# Patient Record
Sex: MALE | Race: WHITE | NOT HISPANIC OR LATINO | Employment: STUDENT | URBAN - METROPOLITAN AREA
[De-identification: names, ages, dates, MRNs, and addresses within clinical notes are randomized per-mention and may not be internally consistent; named-entity substitution may affect disease eponyms.]

---

## 2021-02-05 ENCOUNTER — MEDICAL CORRESPONDENCE (OUTPATIENT)
Dept: HEALTH INFORMATION MANAGEMENT | Facility: CLINIC | Age: 21
End: 2021-02-05

## 2021-02-05 ENCOUNTER — TRANSFERRED RECORDS (OUTPATIENT)
Dept: HEALTH INFORMATION MANAGEMENT | Facility: CLINIC | Age: 21
End: 2021-02-05

## 2021-02-16 NOTE — TELEPHONE ENCOUNTER
REFERRAL INFORMATION:    Referring Provider:  Dr. Baltazar Blevins    Referring Clinic:  [facility]     Reason for Visit/Diagnosis: Inguinal Hernia        FUTURE VISIT INFORMATION:    Appointment Date: 2/18/2021    Appointment Time: 7 AM      NOTES RECORD STATUS  DETAILS   OFFICE NOTE from Referring Provider Received 1/11/2021 Office visit with Dr. Blevins    OFFICE NOTE from Other Specialists N/A    HOSPITAL DISCHARGE SUMMARY/ ED VISITS  N/A    OPERATIVE REPORT N/A    ENDOSCOPY (EGD)  N/A    PERTINENT LABS N/A    PATHOLOGY REPORTS (RELATED) N/A    IMAGING (CT, MRI, US, XR)  N/A

## 2021-02-18 ENCOUNTER — OFFICE VISIT (OUTPATIENT)
Dept: SURGERY | Facility: CLINIC | Age: 21
End: 2021-02-18
Payer: COMMERCIAL

## 2021-02-18 ENCOUNTER — PRE VISIT (OUTPATIENT)
Dept: SURGERY | Facility: CLINIC | Age: 21
End: 2021-02-18

## 2021-02-18 VITALS
HEART RATE: 66 BPM | SYSTOLIC BLOOD PRESSURE: 119 MMHG | WEIGHT: 167.1 LBS | OXYGEN SATURATION: 97 % | BODY MASS INDEX: 25.33 KG/M2 | DIASTOLIC BLOOD PRESSURE: 77 MMHG | HEIGHT: 68 IN

## 2021-02-18 DIAGNOSIS — K40.90 NON-RECURRENT UNILATERAL INGUINAL HERNIA WITHOUT OBSTRUCTION OR GANGRENE: Primary | ICD-10-CM

## 2021-02-18 PROCEDURE — 99202 OFFICE O/P NEW SF 15 MIN: CPT | Performed by: SURGERY

## 2021-02-18 RX ORDER — CLINDAMYCIN PHOSPHATE 900 MG/50ML
900 INJECTION, SOLUTION INTRAVENOUS
Status: CANCELLED | OUTPATIENT
Start: 2021-02-18

## 2021-02-18 RX ORDER — CETIRIZINE HYDROCHLORIDE 10 MG/1
10 TABLET ORAL DAILY
COMMUNITY

## 2021-02-18 RX ORDER — CLINDAMYCIN PHOSPHATE 900 MG/50ML
900 INJECTION, SOLUTION INTRAVENOUS SEE ADMIN INSTRUCTIONS
Status: CANCELLED | OUTPATIENT
Start: 2021-02-18

## 2021-02-18 ASSESSMENT — PAIN SCALES - GENERAL: PAINLEVEL: NO PAIN (0)

## 2021-02-18 ASSESSMENT — MIFFLIN-ST. JEOR: SCORE: 1737.46

## 2021-02-18 NOTE — LETTER
2/18/2021       RE: Herminio Persaud  [address]  [facility] [address]     Dear Colleague,    Thank you for referring your patient, Herminio Persaud, to the [facility] GENERAL SURGERY CLINIC [place] at [facility]. Please see a copy of my visit note below.    New Hernia Consultation Note      Herminio Persaud  [id number]  2000    Requesting Provider: Baltazar Blevins    I was asked by Baltazar Blevins to see this patient for the following problem: Herminio Persaud is a 20 year old male who presents to clinic today for the following health issues:      CHIEF COMPLAINT:  Left groin bulge of 8 years' duration.      ASSESSMENT/PLAN:  There is a small, but visible left inguinal hernia; doesn't involve scrotum; reducible.    Hernia size is less than 5cm in size.    Assessment & Plan   Problem List Items Addressed This Visit     None      Left inguinal hernia.     Will plan laparoscopic left inguinal hernia repair with mesh.  Same day surgery; [address] floor, [address] [place] [address] surgery location.    Pre-anesthesia Clinic for preop history and physical.    Will need covid test within 4 days of surgery.    25 minutes spent on the date of the encounter doing chart review, patient visit and documentation     HISTORY OF PRESENT ILLNESS:  Location: left inguinal  Severity: Mild     No flowsheet data found.    No flowsheet data found.    No flowsheet data found.              Patient Supplied Answers To HerQLes Assessment Questionnaire  No flowsheet data found.  _______________________________________________________________________            NUTRITIONAL STATUS:  No results found for: ALBUMIN    Body mass index is 25.41 kg/m .    Patient is not immunosuppressed.    Patient is not a current smoker.    No past medical history on file.    There is no problem list on file for this patient.      No past surgical history on file.    MEDICATIONS:  Current Outpatient Medications  "  Medication     cetirizine (ZYRTEC) 10 MG tablet     No current facility-administered medications for this visit.        ALLERGIES:  Allergies   Allergen Reactions     Amoxicillin Rash       Social History     Socioeconomic History     Marital status: Single     Spouse name: Not on file     Number of children: Not on file     Years of education: Not on file     Highest education level: Not on file   Occupational History     Not on file   Social Needs     Financial resource strain: Not on file     Food insecurity     Worry: Not on file     Inability: Not on file     Transportation needs     Medical: Not on file     Non-medical: Not on file   Tobacco Use     Smoking status: Not on file   Substance and Sexual Activity     Alcohol use: Not on file     Drug use: Not on file     Sexual activity: Not on file   Lifestyle     Physical activity     Days per week: Not on file     Minutes per session: Not on file     Stress: Not on file   Relationships     Social connections     Talks on phone: Not on file     Gets together: Not on file     Attends Orthodox service: Not on file     Active member of club or organization: Not on file     Attends meetings of clubs or organizations: Not on file     Relationship status: Not on file     Intimate partner violence     Fear of current or ex partner: Not on file     Emotionally abused: Not on file     Physically abused: Not on file     Forced sexual activity: Not on file   Other Topics Concern     Not on file   Social History Narrative     Not on file       No family history on file.    ROS        PHYSICAL EXAM:  Objective    /77 (BP Location: Left arm, Patient Position: Chair, Cuff Size: Adult Regular)   Pulse 66   Ht 1.727 m (5' 8\")   Wt 75.8 kg (167 lb 1.6 oz)   SpO2 97%   BMI 25.41 kg/m      Physical Exam   NAD breathing unlabored.  WDWN    There is a small left inguinal hernia; bulge is visible; confined to above scrotum.    Left/right testicles are normal.    There is " no hernia on the right side.    No skin color changes.      IMAGING:  CT scan reviewed: not needed.      DISCUSSION OF RISKS:  I discussed the alternatives, benefits, risks and possible complications of hernia repair with the patient. The risks of hernia surgery with and without mesh are described below.    Based on FDA s analysis of medical device adverse event reports and of peer-reviewed, scientific literature, the most common adverse events for all surgical repair of hernias--with or without mesh--are pain, infection, hernia recurrence, scar-like tissue that sticks tissues together (adhesion), blockage of the large or small intestine (obstruction), bleeding, abnormal connection between organs, vessels, or intestines (fistula), fluid build-up at the surgical site (seroma), and a hole in neighboring tissues or organs (perforation).  Some other potential adverse events that can occur following hernia repair with mesh are mesh migration and mesh shrinkage (contraction).    http://www.fda.gov/MedicalDevices/ProductsandMedicalProcedures/ImplantsandProsthetics/HerniaSurgicalMesh/default.htm    PLAN:  Hernia surgery is indicated, Same day surgery, Length of procedure is estimated to be 90 minutes and Refer to PAC    I spent a total of 25 minutes face-to-face with Herminio Persaud during today's office visit.  Over 50% of this time was spent counseling the patient and/or coordinating care regarding left inguinal hernia.  See note for details.        Sincerely,    Nikolas Davis MD

## 2021-02-18 NOTE — PATIENT INSTRUCTIONS
Plan laparoscopic left inguinal hernia repair.    Same day surgery.    Call Bharath at [phone] for scheduling.    Preop with PAC team (Bharath will schedule) or with Renetta.

## 2021-02-18 NOTE — NURSING NOTE
"Chief Complaint   Patient presents with     Consult     Consultation Hernia Repair Surgery       Vitals:    02/18/21 0701   BP: 119/77   BP Location: Left arm   Patient Position: Chair   Cuff Size: Adult Regular   Pulse: 66   SpO2: 97%   Weight: 75.8 kg (167 lb 1.6 oz)   Height: 1.727 m (5' 8\")       Body mass index is 25.41 kg/m .                            "

## 2021-02-18 NOTE — PROGRESS NOTES
New Hernia Consultation Note      Herminio Persaud  [id number]  2000    Requesting Provider: Baltazar Blevins    I was asked by Baltazar Blevins to see this patient for the following problem: Herminio Persaud is a 20 year old male who presents to clinic today for the following health issues:      CHIEF COMPLAINT:  Left groin bulge of 8 years' duration.      ASSESSMENT/PLAN:  There is a small, but visible left inguinal hernia; doesn't involve scrotum; reducible.    Hernia size is less than 5cm in size.    Assessment & Plan   Problem List Items Addressed This Visit     None      Left inguinal hernia.     Will plan laparoscopic left inguinal hernia repair with mesh.  Same day surgery; [facility] 5th floor, [address] [place] [address] surgery location.    Pre-anesthesia Clinic for preop history and physical.    Will need covid test within 4 days of surgery.    25 minutes spent on the date of the encounter doing chart review, patient visit and documentation     HISTORY OF PRESENT ILLNESS:  Location: left inguinal  Severity: Mild     No flowsheet data found.    No flowsheet data found.    No flowsheet data found.              Patient Supplied Answers To HerQLes Assessment Questionnaire  No flowsheet data found.  _______________________________________________________________________            NUTRITIONAL STATUS:  No results found for: ALBUMIN    Body mass index is 25.41 kg/m .    Patient is not immunosuppressed.    Patient is not a current smoker.    No past medical history on file.    There is no problem list on file for this patient.      No past surgical history on file.    MEDICATIONS:  Current Outpatient Medications   Medication     cetirizine (ZYRTEC) 10 MG tablet     No current facility-administered medications for this visit.        ALLERGIES:  Allergies   Allergen Reactions     Amoxicillin Rash       Social History     Socioeconomic History     Marital status: Single     Spouse name: Not on file     Number of  "children: Not on file     Years of education: Not on file     Highest education level: Not on file   Occupational History     Not on file   Social Needs     Financial resource strain: Not on file     Food insecurity     Worry: Not on file     Inability: Not on file     Transportation needs     Medical: Not on file     Non-medical: Not on file   Tobacco Use     Smoking status: Not on file   Substance and Sexual Activity     Alcohol use: Not on file     Drug use: Not on file     Sexual activity: Not on file   Lifestyle     Physical activity     Days per week: Not on file     Minutes per session: Not on file     Stress: Not on file   Relationships     Social connections     Talks on phone: Not on file     Gets together: Not on file     Attends Sabianism service: Not on file     Active member of club or organization: Not on file     Attends meetings of clubs or organizations: Not on file     Relationship status: Not on file     Intimate partner violence     Fear of current or ex partner: Not on file     Emotionally abused: Not on file     Physically abused: Not on file     Forced sexual activity: Not on file   Other Topics Concern     Not on file   Social History Narrative     Not on file       No family history on file.    ROS        PHYSICAL EXAM:  Objective    /77 (BP Location: Left arm, Patient Position: Chair, Cuff Size: Adult Regular)   Pulse 66   Ht 1.727 m (5' 8\")   Wt 75.8 kg (167 lb 1.6 oz)   SpO2 97%   BMI 25.41 kg/m      Physical Exam   NAD breathing unlabored.  WDWN    There is a small left inguinal hernia; bulge is visible; confined to above scrotum.    Left/right testicles are normal.    There is no hernia on the right side.    No skin color changes.      IMAGING:  CT scan reviewed: not needed.      DISCUSSION OF RISKS:  I discussed the alternatives, benefits, risks and possible complications of hernia repair with the patient. The risks of hernia surgery with and without mesh are described " below.    Based on FDA s analysis of medical device adverse event reports and of peer-reviewed, scientific literature, the most common adverse events for all surgical repair of hernias--with or without mesh--are pain, infection, hernia recurrence, scar-like tissue that sticks tissues together (adhesion), blockage of the large or small intestine (obstruction), bleeding, abnormal connection between organs, vessels, or intestines (fistula), fluid build-up at the surgical site (seroma), and a hole in neighboring tissues or organs (perforation).  Some other potential adverse events that can occur following hernia repair with mesh are mesh migration and mesh shrinkage (contraction).    http://www.fda.gov/MedicalDevices/ProductsandMedicalProcedures/ImplantsandProsthetics/HerniaSurgicalMesh/default.htm    PLAN:  Hernia surgery is indicated, Same day surgery, Length of procedure is estimated to be 90 minutes and Refer to PAC    I spent a total of 25 minutes face-to-face with Herminio Persaud during today's office visit.  Over 50% of this time was spent counseling the patient and/or coordinating care regarding left inguinal hernia.  See note for details.        Sincerely,    Nikolas Davis MD

## 2021-06-15 ENCOUNTER — OFFICE VISIT (OUTPATIENT)
Dept: FAMILY MEDICINE | Facility: CLINIC | Age: 21
End: 2021-06-15
Payer: COMMERCIAL

## 2021-06-15 VITALS
WEIGHT: 168 LBS | OXYGEN SATURATION: 97 % | TEMPERATURE: 97.9 F | BODY MASS INDEX: 25.46 KG/M2 | DIASTOLIC BLOOD PRESSURE: 62 MMHG | RESPIRATION RATE: 20 BRPM | HEART RATE: 57 BPM | HEIGHT: 68 IN | SYSTOLIC BLOOD PRESSURE: 104 MMHG

## 2021-06-15 DIAGNOSIS — K40.90 UNILATERAL INGUINAL HERNIA WITHOUT OBSTRUCTION OR GANGRENE, RECURRENCE NOT SPECIFIED: Primary | ICD-10-CM

## 2021-06-15 PROCEDURE — 99203 OFFICE O/P NEW LOW 30 MIN: CPT | Performed by: NURSE PRACTITIONER

## 2021-06-15 SDOH — HEALTH STABILITY: MENTAL HEALTH: HOW OFTEN DO YOU HAVE 6 OR MORE DRINKS ON ONE OCCASION?: MONTHLY

## 2021-06-15 SDOH — HEALTH STABILITY: MENTAL HEALTH: HOW OFTEN DO YOU HAVE A DRINK CONTAINING ALCOHOL?: 2-3 TIMES A WEEK

## 2021-06-15 SDOH — HEALTH STABILITY: MENTAL HEALTH
STRESS IS WHEN SOMEONE FEELS TENSE, NERVOUS, ANXIOUS, OR CAN'T SLEEP AT NIGHT BECAUSE THEIR MIND IS TROUBLED. HOW STRESSED ARE YOU?: NOT ASKED

## 2021-06-15 SDOH — HEALTH STABILITY: MENTAL HEALTH: HOW MANY STANDARD DRINKS CONTAINING ALCOHOL DO YOU HAVE ON A TYPICAL DAY?: 5 OR 6

## 2021-06-15 SDOH — HEALTH STABILITY: PHYSICAL HEALTH: ON AVERAGE, HOW MANY DAYS PER WEEK DO YOU ENGAGE IN MODERATE TO STRENUOUS EXERCISE (LIKE A BRISK WALK)?: 5 DAYS

## 2021-06-15 SDOH — HEALTH STABILITY: PHYSICAL HEALTH: ON AVERAGE, HOW MANY MINUTES DO YOU ENGAGE IN EXERCISE AT THIS LEVEL?: NOT ASKED

## 2021-06-15 ASSESSMENT — MIFFLIN-ST. JEOR: SCORE: 1736.54

## 2021-06-15 NOTE — PROGRESS NOTES
Assessment & Plan     Unilateral inguinal hernia without obstruction or gangrene, recurrence not specified  Patient was seen in February 2018 by Dr. Nikolas Davis and noted to have a small left inguinal hernia noted involving the scrotum.  Patient reports hernia remains and has not changed, although he feels he may be developing a right-sided hernia as well.  A laparoscopic left inguinal hernia repair with mesh was advised.  He needs a referral to see Dr. Davis again given his insurance policy.  That has been done.  May need a new consultation prior to recommended surgical procedure.  Patient was advised of this.  - GENERAL SURG ADULT REFERRAL; Future    25 minutes spent on the date of the encounter doing chart review, history and exam, documentation and further activities per the note        Patient Instructions   New referral for General Surgery.   Continue your healthy habits.   Return as needed or in one year for a physical.       Return in about 1 year (around 6/15/2022) for Routine Visit.    MARQUES [facility] JESSICA Durham is a 21 year old who presents for the following health issues     HPI     Patient presents today to establish care at the [facility].  He was previously seen in the [facility] clinic at the [facility].  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  He was seen by Dr. Nikolas Davis on February 18 for a left inguinal hernia.  He was advised to undergo a laparoscopic left inguinal hernia repair with mesh.  Patient states his insurance would not cover further care from Dr. Davis until the patient had a referral from his primary care provider.  Patient reports hernia is still present but unchanged.  It has been present for about 8 years time.  He notes he may be developing a left-sided hernia as well.    Otherwise, patient denies any chronic or acute  "health conditions.  He was smoking electronic cigarettes but recently quit that.  Drinks alcohol socially, but denies drug use.  Does this at the gym about 5 days/week to work out.  Denies any concerns with his mood other than normal life stressors.             Review of Systems   CONSTITUTIONAL: NEGATIVE for fever, chills, change in weight  INTEGUMENTARY/SKIN: NEGATIVE for worrisome rashes, moles or lesions  EYES: NEGATIVE for vision changes or irritation  ENT/MOUTH: NEGATIVE for ear, mouth and throat problems  RESP: NEGATIVE for significant cough or SOB  CV: NEGATIVE for chest pain, palpitations or peripheral edema  GI: NEGATIVE for nausea, abdominal pain, heartburn, or change in bowel habits  : NEGATIVE for frequency, dysuria, or hematuria  MUSCULOSKELETAL: NEGATIVE for significant arthralgias or myalgia  NEURO: NEGATIVE for weakness, dizziness or paresthesias  ENDOCRINE: NEGATIVE for temperature intolerance, skin/hair changes  HEME: NEGATIVE for bleeding problems  PSYCHIATRIC: NEGATIVE for changes in mood or affect      Objective    /62   Pulse 57   Temp 97.9  F (36.6  C) (Temporal)   Resp 20   Ht 1.727 m (5' 8\")   Wt 76.2 kg (168 lb)   SpO2 97%   BMI 25.54 kg/m    Body mass index is 25.54 kg/m .  Physical Exam   GENERAL: healthy, alert and no distress  EYES: Eyes grossly normal to inspection, PERRL and conjunctivae and sclerae normal  HENT: ear canals and TM's normal, nose and mouth without ulcers or lesions  NECK: no adenopathy, no asymmetry, masses, or scars and thyroid normal to palpation  RESP: lungs clear to auscultation - no rales, rhonchi or wheezes  CV: regular rate and rhythm, normal S1 S2, no S3 or S4, no murmur, click or rub, no peripheral edema and peripheral pulses strong  ABDOMEN: soft, nontender, no hepatosplenomegaly, no masses and bowel sounds normal  MS: no gross musculoskeletal defects noted, no edema  SKIN: no suspicious lesions or rashes  NEURO: Normal strength and tone, " mentation intact and speech normal  PSYCH: mentation appears normal, affect normal/bright    No results found for any previous visit.

## 2021-06-15 NOTE — PATIENT INSTRUCTIONS
New referral for General Surgery.   Continue your healthy habits.   Return as needed or in one year for a physical.

## 2022-01-13 ENCOUNTER — OFFICE VISIT (OUTPATIENT)
Dept: SURGERY | Facility: CLINIC | Age: 22
End: 2022-01-13
Payer: COMMERCIAL

## 2022-01-13 VITALS
WEIGHT: 168 LBS | BODY MASS INDEX: 25.54 KG/M2 | DIASTOLIC BLOOD PRESSURE: 74 MMHG | SYSTOLIC BLOOD PRESSURE: 118 MMHG | HEART RATE: 57 BPM

## 2022-01-13 DIAGNOSIS — K40.90 NON-RECURRENT UNILATERAL INGUINAL HERNIA WITHOUT OBSTRUCTION OR GANGRENE: Primary | ICD-10-CM

## 2022-01-13 PROCEDURE — 99203 OFFICE O/P NEW LOW 30 MIN: CPT | Performed by: SURGERY

## 2022-01-13 NOTE — LETTER
1/13/2022         RE: Herminio Persaud  [address]  [facility] [address]        Dear Colleague,    Thank you for referring your patient, Herminio Persaud, to the [facility]. Please see a copy of my visit note below.    Assessment: Primary, reducible left inguinal hernia.    Plan:    We have discussed observation, reduction techniques and importance, incarceration and strangulation signs, symptoms and importance as well as need to seek emergency treatment.      We have had a detailed discussion regarding the nature of hernias, surgery, indications, alternatives, risks, benefits, incisions, scarring, anesthesia, recovery, mesh, infection, bleeding, numbness, nerve damage and chronic pain, testicular loss, hernia recurrence, lifting and activity limitations after surgery.  All questions have been answered to the best of my ability.    We discussed open verses robotic, will do robotic and assess for a hernia on the right and if present fix it at the same time.     We will schedule surgery at the patient's convenience.      HPI:  Herminio is a 21 year old male who I was asked to see regarding his   inguinal hernia by Estefany Dugan. He presents for evaluation of left groin lump.  He first noticed it about 10 years ago. He was seen to have it fixed almost 4 years ago. He denies pain. He feels there may be a hernia on the right as well. Negative for associated symptoms of vomiting.  He has not had a previous herniorrhaphy in this location.     Past Medical History:   has a past medical history of Non-recurrent unilateral inguinal hernia.    Past Surgical History:  Past Surgical History:   Procedure Laterality Date     XR ELBOW ARTHROGRAM RIGHT        Social History:  Social History     Socioeconomic History     Marital status: Single     Spouse name: Not on file     Number of children: Not on file     Years of education: Not on file     Highest education level: Not on file   Occupational History      Not on file   Tobacco Use     Smoking status: Never Smoker     Smokeless tobacco: Former User   Substance and Sexual Activity     Alcohol use: Yes     Alcohol/week: 6.0 standard drinks     Types: 2 Glasses of wine, 2 Cans of beer, 2 Shots of liquor per week     Drug use: Never     Sexual activity: Yes     Birth control/protection: Condom     Comment: most times   Other Topics Concern     Not on file   Social History Narrative     Not on file     Social Determinants of Health     Financial Resource Strain: Not on file   Food Insecurity: Not on file   Transportation Needs: Not on file   Physical Activity: Unknown     Days of Exercise per Week: 5 days     Minutes of Exercise per Session: Not asked   Stress: Not on file   Social Connections: Not on file   Intimate Partner Violence: Not on file   Housing Stability: Not on file        Family History:  Family History   Problem Relation Age of Onset     Kidney Disease Maternal Grandmother      Asthma Brother      ROS:  10 point ROS neg other than the symptoms noted above in the HPI.      PE:   Vitals: /74   Pulse 57   Wt 76.2 kg (168 lb)   BMI 25.54 kg/m    BMI= Body mass index is 25.54 kg/m .  Constitutional: healthy, alert and no distress  Eyes: Pupils round and equal, no icterus   ENT: Mucous membranes moist  Respiratory:  Non-labored respiration  Gastrointestinal: Abdomen soft, non-tender. No masses, organomegaly  Musculoskeletal: No gross deformity  Neurologic: No gross focal deficits  Psychiatric: mentation appears normal and affect normal/bright  Hematologic/Lymphatic/Immunologic: No bruising noted  Skin: No lesions, rashes, erythema or jaundice noted    Hernia- Left inguinal hernia is present spontaneously              Right inguinal hernia is not present with valsalva              The hernia is manually reducible              Testes are descended bilaterally and normal    Arron Barahona, DO          Again, thank you for allowing me to participate in  the care of your patient.        Sincerely,        Arron Barahona, DO

## 2022-01-24 ENCOUNTER — TELEPHONE (OUTPATIENT)
Dept: SURGERY | Facility: CLINIC | Age: 22
End: 2022-01-24
Payer: COMMERCIAL

## 2022-01-24 NOTE — TELEPHONE ENCOUNTER
Type of surgery: Robotic assisted laparoscopic inguinal hernia repair  CPT 30953  Non-recurrent unilateral inguinal hernia without obstruction or gangrene K40.90    Location of surgery: [facility]  Date and time of surgery: 5-20-22  Surgeon: Jun  Pre-Op Appt Date: TBD per patient  Post-Op Appt Date: 6-7-22   Packet sent out: Yes  Pre-cert/Authorization completed:    No prior auth required per [employer] list.    Date: 1-24-22    Laxmi Ch  Prior Authorization Dept  [phone]

## 2022-01-24 NOTE — PROGRESS NOTES
Assessment: Primary, reducible left inguinal hernia.    Plan:    We have discussed observation, reduction techniques and importance, incarceration and strangulation signs, symptoms and importance as well as need to seek emergency treatment.      We have had a detailed discussion regarding the nature of hernias, surgery, indications, alternatives, risks, benefits, incisions, scarring, anesthesia, recovery, mesh, infection, bleeding, numbness, nerve damage and chronic pain, testicular loss, hernia recurrence, lifting and activity limitations after surgery.  All questions have been answered to the best of my ability.    We discussed open verses robotic, will do robotic and assess for a hernia on the right and if present fix it at the same time.     We will schedule surgery at the patient's convenience.      HPI:  Herminio is a 21 year old male who I was asked to see regarding his   inguinal hernia by Estefany Dugan. He presents for evaluation of left groin lump.  He first noticed it about 10 years ago. He was seen to have it fixed almost 4 years ago. He denies pain. He feels there may be a hernia on the right as well. Negative for associated symptoms of vomiting.  He has not had a previous herniorrhaphy in this location.     Past Medical History:   has a past medical history of Non-recurrent unilateral inguinal hernia.    Past Surgical History:  Past Surgical History:   Procedure Laterality Date     XR ELBOW ARTHROGRAM RIGHT        Social History:  Social History     Socioeconomic History     Marital status: Single     Spouse name: Not on file     Number of children: Not on file     Years of education: Not on file     Highest education level: Not on file   Occupational History     Not on file   Tobacco Use     Smoking status: Never Smoker     Smokeless tobacco: Former User   Substance and Sexual Activity     Alcohol use: Yes     Alcohol/week: 6.0 standard drinks     Types: 2 Glasses of wine, 2 Cans of beer, 2  Shots of liquor per week     Drug use: Never     Sexual activity: Yes     Birth control/protection: Condom     Comment: most times   Other Topics Concern     Not on file   Social History Narrative     Not on file     Social Determinants of Health     Financial Resource Strain: Not on file   Food Insecurity: Not on file   Transportation Needs: Not on file   Physical Activity: Unknown     Days of Exercise per Week: 5 days     Minutes of Exercise per Session: Not asked   Stress: Not on file   Social Connections: Not on file   Intimate Partner Violence: Not on file   Housing Stability: Not on file        Family History:  Family History   Problem Relation Age of Onset     Kidney Disease Maternal Grandmother      Asthma Brother      ROS:  10 point ROS neg other than the symptoms noted above in the HPI.      PE:   Vitals: /74   Pulse 57   Wt 76.2 kg (168 lb)   BMI 25.54 kg/m    BMI= Body mass index is 25.54 kg/m .  Constitutional: healthy, alert and no distress  Eyes: Pupils round and equal, no icterus   ENT: Mucous membranes moist  Respiratory:  Non-labored respiration  Gastrointestinal: Abdomen soft, non-tender. No masses, organomegaly  Musculoskeletal: No gross deformity  Neurologic: No gross focal deficits  Psychiatric: mentation appears normal and affect normal/bright  Hematologic/Lymphatic/Immunologic: No bruising noted  Skin: No lesions, rashes, erythema or jaundice noted    Hernia- Left inguinal hernia is present spontaneously              Right inguinal hernia is not present with valsalva              The hernia is manually reducible              Testes are descended bilaterally and normal    Arron Barahona DO

## 2022-03-05 ENCOUNTER — HEALTH MAINTENANCE LETTER (OUTPATIENT)
Age: 22
End: 2022-03-05

## 2022-04-28 NOTE — PROGRESS NOTES
[address]  [address]  [facility] [address]  Phone: [phone]  Fax: [phone]  Primary Provider: Estefany Dugan  Pre-op Performing Provider: CLINT HARMAN      PREOPERATIVE EVALUATION:  Today's date: 4/29/2022    Herminio Persaud is a 22 year old male who presents for a preoperative evaluation.    Surgical Information:  Surgery/Procedure: Inguinal Hernia repair  Surgery Location: [facility]  Surgeon: Dr. Arron Barahona  Surgery Date: 05/20/2022  Time of Surgery: 7:30 am  Where patient plans to recover: At home with family  Fax number for surgical facility: Note does not need to be faxed, will be available electronically in Epic.    Type of Anesthesia Anticipated: to be determined    Assessment & Plan     The proposed surgical procedure is considered LOW risk.    Preop general physical exam  Ok for procedure  - CBC with platelets; Future    Unilateral inguinal hernia without obstruction or gangrene, recurrence not specified  enlarging           Risks and Recommendations:  The patient has the following additional risks and recommendations for perioperative complications:   - No identified additional risk factors other than previously addressed        RECOMMENDATION:  APPROVAL GIVEN to proceed with proposed procedure, without further diagnostic evaluation.    Review of external notes as documented above                   Subjective     HPI related to upcoming procedure: see surgical notes      Health Care Directive:  Patient does not have a Health Care Directive or Living Will:     Preoperative Review of :   reviewed - no record of controlled substances prescribed.          Review of Systems  CONSTITUTIONAL: NEGATIVE for fever, chills, change in weight  INTEGUMENTARY/SKIN: NEGATIVE for worrisome rashes, moles or lesions  EYES: NEGATIVE for vision changes or irritation  ENT/MOUTH: NEGATIVE for ear, mouth and throat problems  RESP:  NEGATIVE for significant cough or SOB  CV: NEGATIVE for chest pain, palpitations or peripheral edema  GI: NEGATIVE for nausea, abdominal pain, heartburn, or change in bowel habits  : NEGATIVE for frequency, dysuria, or hematuria  MUSCULOSKELETAL: NEGATIVE for significant arthralgias or myalgia  NEURO: NEGATIVE for weakness, dizziness or paresthesias  ENDOCRINE: NEGATIVE for temperature intolerance, skin/hair changes  HEME: NEGATIVE for bleeding problems  PSYCHIATRIC: NEGATIVE for changes in mood or affect    There are no problems to display for this patient.     Past Medical History:   Diagnosis Date     Non-recurrent unilateral inguinal hernia      Past Surgical History:   Procedure Laterality Date     XR ELBOW ARTHROGRAM RIGHT       Current Outpatient Medications   Medication Sig Dispense Refill     cetirizine (ZYRTEC) 10 MG tablet Take 10 mg by mouth daily         Allergies   Allergen Reactions     Amoxicillin Rash        Social History     Tobacco Use     Smoking status: Never Smoker     Smokeless tobacco: Former User   Substance Use Topics     Alcohol use: Yes     Alcohol/week: 6.0 standard drinks     Types: 2 Glasses of wine, 2 Cans of beer, 2 Shots of liquor per week       History   Drug Use Unknown         Objective     There were no vitals taken for this visit.    Physical Exam    GENERAL APPEARANCE: healthy, alert and no distress     EYES: EOMI,  PERRL     HENT: ear canals and TM's normal and nose and mouth without ulcers or lesions     NECK: no adenopathy, no asymmetry, masses, or scars and thyroid normal to palpation     RESP: lungs clear to auscultation - no rales, rhonchi or wheezes     CV: regular rates and rhythm, normal S1 S2, no S3 or S4 and no murmur, click or rub     ABDOMEN:  soft, nontender, no HSM or masses and bowel sounds normal     MS: extremities normal- no gross deformities noted, no evidence of inflammation in joints, FROM in all extremities.     SKIN: no suspicious lesions or rashes      NEURO: Normal strength and tone, sensory exam grossly normal, mentation intact and speech normal     PSYCH: mentation appears normal. and affect normal/bright     LYMPHATICS: No cervical adenopathy    No results for input(s): HGB, PLT, INR, NA, POTASSIUM, CR, A1C in the last 87080 hours.     Diagnostics:  No labs were ordered during this visit.   No EKG required for low risk surgery (cataract, skin procedure, breast biopsy, etc).    Revised Cardiac Risk Index (RCRI):  The patient has the following serious cardiovascular risks for perioperative complications:   - No serious cardiac risks = 0 points     RCRI Interpretation: 0 points: Class I (very low risk - 0.4% complication rate)           Signed Electronically by: Raf Hernadez MD  Copy of this evaluation report is provided to requesting physician.

## 2022-04-29 ENCOUNTER — OFFICE VISIT (OUTPATIENT)
Dept: FAMILY MEDICINE | Facility: CLINIC | Age: 22
End: 2022-04-29
Payer: COMMERCIAL

## 2022-04-29 ENCOUNTER — LAB (OUTPATIENT)
Dept: LAB | Facility: CLINIC | Age: 22
End: 2022-04-29

## 2022-04-29 VITALS
DIASTOLIC BLOOD PRESSURE: 71 MMHG | OXYGEN SATURATION: 98 % | HEIGHT: 68 IN | BODY MASS INDEX: 26.73 KG/M2 | HEART RATE: 65 BPM | SYSTOLIC BLOOD PRESSURE: 111 MMHG | TEMPERATURE: 96.9 F | WEIGHT: 176.4 LBS

## 2022-04-29 DIAGNOSIS — Z11.4 SCREENING FOR HIV (HUMAN IMMUNODEFICIENCY VIRUS): ICD-10-CM

## 2022-04-29 DIAGNOSIS — Z11.59 NEED FOR HEPATITIS C SCREENING TEST: ICD-10-CM

## 2022-04-29 DIAGNOSIS — Z01.818 PREOP GENERAL PHYSICAL EXAM: Primary | ICD-10-CM

## 2022-04-29 DIAGNOSIS — Z01.818 PREOP GENERAL PHYSICAL EXAM: ICD-10-CM

## 2022-04-29 DIAGNOSIS — K40.90 UNILATERAL INGUINAL HERNIA WITHOUT OBSTRUCTION OR GANGRENE, RECURRENCE NOT SPECIFIED: ICD-10-CM

## 2022-04-29 LAB
ERYTHROCYTE [DISTWIDTH] IN BLOOD BY AUTOMATED COUNT: 12.8 % (ref 10–15)
HCT VFR BLD AUTO: 41 % (ref 40–53)
HGB BLD-MCNC: 14.2 G/DL (ref 13.3–17.7)
MCH RBC QN AUTO: 28.7 PG (ref 26.5–33)
MCHC RBC AUTO-ENTMCNC: 34.6 G/DL (ref 31.5–36.5)
MCV RBC AUTO: 83 FL (ref 78–100)
PLATELET # BLD AUTO: 276 10E3/UL (ref 150–450)
RBC # BLD AUTO: 4.94 10E6/UL (ref 4.4–5.9)
WBC # BLD AUTO: 4.9 10E3/UL (ref 4–11)

## 2022-04-29 PROCEDURE — 36415 COLL VENOUS BLD VENIPUNCTURE: CPT

## 2022-04-29 PROCEDURE — 86803 HEPATITIS C AB TEST: CPT

## 2022-04-29 PROCEDURE — 87389 HIV-1 AG W/HIV-1&-2 AB AG IA: CPT

## 2022-04-29 PROCEDURE — 85027 COMPLETE CBC AUTOMATED: CPT

## 2022-04-29 PROCEDURE — 99214 OFFICE O/P EST MOD 30 MIN: CPT | Performed by: FAMILY MEDICINE

## 2022-05-02 LAB
HCV AB SERPL QL IA: NONREACTIVE
HIV 1+2 AB+HIV1 P24 AG SERPL QL IA: NONREACTIVE

## 2022-05-05 ENCOUNTER — MYC MEDICAL ADVICE (OUTPATIENT)
Dept: FAMILY MEDICINE | Facility: CLINIC | Age: 22
End: 2022-05-05
Payer: COMMERCIAL

## 2022-05-05 DIAGNOSIS — Z02.9 ADMINISTRATIVE ENCOUNTER: Primary | ICD-10-CM

## 2022-05-05 NOTE — TELEPHONE ENCOUNTER
Shoppilot message sent to patient.     Thanks,  NATALYA Chambers  [facility]

## 2022-05-19 ENCOUNTER — LAB (OUTPATIENT)
Dept: LAB | Facility: CLINIC | Age: 22
End: 2022-05-19
Payer: COMMERCIAL

## 2022-05-19 DIAGNOSIS — Z02.9 ADMINISTRATIVE ENCOUNTER: ICD-10-CM

## 2022-05-19 PROCEDURE — 36415 COLL VENOUS BLD VENIPUNCTURE: CPT

## 2022-05-19 PROCEDURE — 86481 TB AG RESPONSE T-CELL SUSP: CPT

## 2022-05-21 LAB
GAMMA INTERFERON BACKGROUND BLD IA-ACNC: 0.02 IU/ML
M TB IFN-G BLD-IMP: NEGATIVE
M TB IFN-G CD4+ BCKGRND COR BLD-ACNC: 9.98 IU/ML
MITOGEN IGNF BCKGRD COR BLD-ACNC: 0 IU/ML
MITOGEN IGNF BCKGRD COR BLD-ACNC: 0 IU/ML
QUANTIFERON MITOGEN: 10 IU/ML
QUANTIFERON NIL TUBE: 0.02 IU/ML
QUANTIFERON TB1 TUBE: 0.02 IU/ML
QUANTIFERON TB2 TUBE: 0.02

## 2022-06-07 ENCOUNTER — OFFICE VISIT (OUTPATIENT)
Dept: SURGERY | Facility: CLINIC | Age: 22
End: 2022-06-07
Payer: COMMERCIAL

## 2022-06-07 VITALS
WEIGHT: 176 LBS | DIASTOLIC BLOOD PRESSURE: 78 MMHG | HEART RATE: 61 BPM | SYSTOLIC BLOOD PRESSURE: 124 MMHG | BODY MASS INDEX: 26.96 KG/M2

## 2022-06-07 DIAGNOSIS — K40.90 INGUINAL HERNIA WITHOUT OBSTRUCTION OR GANGRENE, RECURRENCE NOT SPECIFIED, UNSPECIFIED LATERALITY: Primary | ICD-10-CM

## 2022-06-07 PROCEDURE — 99024 POSTOP FOLLOW-UP VISIT: CPT | Performed by: SURGERY

## 2022-06-07 NOTE — LETTER
6/7/2022         RE: Herminio Persaud  [address] [facility] [address] [place] [id number]        Dear Colleague,    Thank you for referring your patient, Herminio Persaud, to the [facility]. Please see a copy of my visit note below.    General Surgery Post Op    Pt returns for follow up visit s/p inguinal hernia repair    Patient has been doing well, tolerating diet. Bowels moving well. Pain controlled. No issues with wound healing/redness/drainage. No fevers.    Physical exam: Vitals: /78   Pulse 61   Wt 79.8 kg (176 lb)   BMI 26.96 kg/m    BMI= Body mass index is 26.96 kg/m .    Exam:  Constitutional: healthy, alert and no distress  Respiratory: Non-labored  Gastrointestinal: Abdomen soft, non-tender. No masses, organomegaly  Incisions are healing well with no erythema or exudate    Assessment: Pt is doing well     Plan: Pt doing well and can follow up as needed.       Arron Barahona DO          Again, thank you for allowing me to participate in the care of your patient.        Sincerely,        Arron Barahona DO

## 2022-07-15 NOTE — PROGRESS NOTES
General Surgery Post Op    Pt returns for follow up visit s/p inguinal hernia repair    Patient has been doing well, tolerating diet. Bowels moving well. Pain controlled. No issues with wound healing/redness/drainage. No fevers.    Physical exam: Vitals: /78   Pulse 61   Wt 79.8 kg (176 lb)   BMI 26.96 kg/m    BMI= Body mass index is 26.96 kg/m .    Exam:  Constitutional: healthy, alert and no distress  Respiratory: Non-labored  Gastrointestinal: Abdomen soft, non-tender. No masses, organomegaly  Incisions are healing well with no erythema or exudate    Assessment: Pt is doing well     Plan: Pt doing well and can follow up as needed.       Arron Barahona, DO

## 2022-09-08 ENCOUNTER — OFFICE VISIT (OUTPATIENT)
Dept: FAMILY MEDICINE | Facility: CLINIC | Age: 22
End: 2022-09-08
Payer: COMMERCIAL

## 2022-09-08 VITALS
TEMPERATURE: 97.9 F | OXYGEN SATURATION: 99 % | SYSTOLIC BLOOD PRESSURE: 122 MMHG | DIASTOLIC BLOOD PRESSURE: 77 MMHG | HEART RATE: 51 BPM | BODY MASS INDEX: 26.52 KG/M2 | HEIGHT: 68 IN | WEIGHT: 175 LBS

## 2022-09-08 DIAGNOSIS — Z23 NEED FOR VACCINATION: ICD-10-CM

## 2022-09-08 DIAGNOSIS — L91.8 SKIN TAG: Primary | ICD-10-CM

## 2022-09-08 DIAGNOSIS — L81.9 ATYPICAL PIGMENTED SKIN LESION: ICD-10-CM

## 2022-09-08 PROCEDURE — 99213 OFFICE O/P EST LOW 20 MIN: CPT | Mod: 25 | Performed by: INTERNAL MEDICINE

## 2022-09-08 PROCEDURE — 90471 IMMUNIZATION ADMIN: CPT | Performed by: INTERNAL MEDICINE

## 2022-09-08 PROCEDURE — 90686 IIV4 VACC NO PRSV 0.5 ML IM: CPT | Performed by: INTERNAL MEDICINE

## 2022-09-08 ASSESSMENT — PAIN SCALES - GENERAL: PAINLEVEL: NO PAIN (0)

## 2022-09-08 NOTE — PROGRESS NOTES
Assessment and Plan  1. Skin tag  New problem, positive for a engorged skin tag on the Rt groin which is causing him discomfort. Discussed on dermatology needs which patient would like both the skin lesions removed as below    - Adult Dermatology Referral; Future    2. Atypical pigmented skin lesion  New problem, positive for a atypical pigmented lesion on the back of trunk. Will place referral to dermatology for removal .   - Adult Dermatology Referral; Future    3. Need for vaccination  - INFLUENZA VACCINE IM > 6 MONTHS VALENT IIV4 (AFLURIA/FLUZONE)     Patient Instructions   Placed referral to dermatology for need of removal of the skin lesion.     ================        Return in about 3 months (around 12/8/2022), or if symptoms worsen or fail to improve, for Preventative Visit.    Carlyn Toth MD  [facility] 22 year [facility], presenting for the following health issues:  Mole      History of Present Illness       Reason for visit:  I have a andria on my back that I think has irregular borders, is raised and slightly discolored  Symptom onset:  More than a month  Symptoms include:  No symptoms, just the andria that is concerning  Symptom intensity:  Mild  Symptom progression:  Staying the same  Had these symptoms before:  No  What makes it worse:  No  What makes it better:  No    He eats 2-3 servings of fruits and vegetables daily.He consumes 1 sweetened beverage(s) daily.He exercises with enough effort to increase his heart rate 30 to 60 minutes per day.  He exercises with enough effort to increase his heart rate 4 days per week.   He is taking medications regularly.    Pt is new rosie me, has been seeing [facility] , last seen them in 6./2021 for Inguinal hernia and preop clearance. He is here today for skin mole concerns.       Review of Systems   Constitutional, HEENT, cardiovascular, pulmonary, GI, , musculoskeletal, neuro, skin, endocrine and psych  "systems are negative, except as otherwise noted.      Objective    /77   Pulse 51   Temp 97.9  F (36.6  C) (Temporal)   Ht 1.72 m (5' 7.7\")   Wt 79.4 kg (175 lb)   SpO2 99%   BMI 26.85 kg/m    Body mass index is 26.85 kg/m .  Physical Exam   GENERAL: healthy, alert and no distress  NECK: no adenopathy, no asymmetry, masses, or scars and thyroid normal to palpation  RESP: lungs clear to auscultation - no rales, rhonchi or wheezes  CV: regular rate and rhythm, normal S1 S2, no S3 or S4, no murmur, click or rub, no peripheral edema and peripheral pulses strong  ABDOMEN: soft, nontender, no hepatosplenomegaly, no masses and bowel sounds normal  MS: no gross musculoskeletal defects noted, no edema  SKIN : positive for a engorged skin tag on the Rt groin which is causing him discomfort.  positive for a atypical pigmented lesion on the back of trunk.      "

## 2023-02-08 ENCOUNTER — OFFICE VISIT (OUTPATIENT)
Dept: FAMILY MEDICINE | Facility: CLINIC | Age: 23
End: 2023-02-08
Payer: COMMERCIAL

## 2023-02-08 DIAGNOSIS — Z12.83 ENCOUNTER FOR SCREENING FOR MALIGNANT NEOPLASM OF SKIN: ICD-10-CM

## 2023-02-08 DIAGNOSIS — L81.4 LENTIGINES: ICD-10-CM

## 2023-02-08 DIAGNOSIS — D22.9 MULTIPLE BENIGN NEVI: Primary | ICD-10-CM

## 2023-02-08 PROCEDURE — 99203 OFFICE O/P NEW LOW 30 MIN: CPT | Performed by: NURSE PRACTITIONER

## 2023-02-08 ASSESSMENT — PAIN SCALES - GENERAL: PAINLEVEL: NO PAIN (0)

## 2023-02-08 NOTE — LETTER
2/8/2023         RE: Herminio Persaud  [address] [place] [id number]        Dear Colleague,    Thank you for referring your patient, Herminio Persaud, to the [facility] [place]. Please see a copy of my visit note below.    [facility] Dermatology Note  Encounter Date: Feb 8, 2023  Office Visit     Reviewed patients past medical history and pertinent chart review prior to patients visit today.     Dermatology Problem List:  Patient denies personal history of skin cancer or dysplastic nevi.    Patient denies family history of skin cancer or dysplastic nevi.      ____________________________________________    Assessment & Plan:     # Benign skin findings including: seborrheic keratoses, cherry angioma, lentigines and benign nevi.   - No further intervention required. Patient to report changes.   - Patient reassured of the benign nature of these lesions.  - If lesion grows, changes, becomes symptomatic, bleeds without trauma, or becomes concerning to patient for any reason I would like to see them back for follow up to recheck for signs of malignancy. I discussed this with patient and patient agrees.      #Signs and Symptoms of non-melanoma skin cancer and ABCDEs of melanoma reviewed with patient. Patient encouraged to perform monthly self skin exams and educated on how to perform them. UV precautions reviewed with patient. Patient was asked about new or changing moles/lesions on body.     #Reviewed Sunscreen: Apply 20 minutes prior to going outdoors and reapply every two hours, when wet or sweating. We recommend using an SPF 30 or higher, and to use one that is water resistant.       Follow-up:  2 years for follow up full body skin exam, prn for new or changing lesions or new concerns    Marion Pinon, MARQUES CNP on 2/8/2023 at 4:38 PM    ____________________________________________    CC: Skin Tags (In groin area ) and Skin Check (Spot check mid back  )    HPI:  Mr. Herminio Persaud is a(n) 22 year old male who presents today as a new patient for a full body skin cancer screening. Patient has concerns today about a brown spot on his back that he thinks he has had for several years.  He has not noticed anything changing about it but his girlfriend is concerned about it because it is big and dark.  It is asymptomatic.  He also would like me to check out what he thinks is a skin tag on the right inguinal fold.  It is not bothersome or symptomatic to him..     Patient is otherwise feeling well, without additional skin concerns.     Physical Exam:  Vitals: There were no vitals taken for this visit.  SKIN: Total skin excluding the genitalia areas was performed. The exam included the head/face, neck, both arms, chest, back, abdomen, both legs, digits, mons pubis, buttock and nails.   -Brown soft papule on the right inguinal fold about 4 to 5 mm in diameter  -Mid upper back, 8 x 9 mm slightly irregular shaped darker brown homogenous macule, see photograph  -multiple tan/brown flat round macules and raised papules scattered throughout trunk, extremities and head. No worrisome features for malignancy noted on examination.  -scattered tan, homogenous macules scattered on sun exposed areas of trunk, extremities and face.     - No other lesions of concern on areas examined.         Medications:  Current Outpatient Medications   Medication     cetirizine (ZYRTEC) 10 MG tablet     No current facility-administered medications for this visit.      Past Medical History:   Patient Active Problem List   Diagnosis     Atypical pigmented skin lesion     Skin tag     Past Medical History:   Diagnosis Date     Non-recurrent unilateral inguinal hernia        CC Carlyn Toth MD  [address] [facility] [place] [address] on close of this encounter.      Again, thank you for allowing me to participate in the care of your patient.        Sincerely,        MARQUES Earl  CNP

## 2023-02-08 NOTE — PROGRESS NOTES
[facility] Dermatology Note  Encounter Date: Feb 8, 2023  Office Visit     Reviewed patients past medical history and pertinent chart review prior to patients visit today.     Dermatology Problem List:  Patient denies personal history of skin cancer or dysplastic nevi.    Patient denies family history of skin cancer or dysplastic nevi.      ____________________________________________    Assessment & Plan:     # Benign skin findings including: seborrheic keratoses, cherry angioma, lentigines and benign nevi.   - No further intervention required. Patient to report changes.   - Patient reassured of the benign nature of these lesions.  - If lesion grows, changes, becomes symptomatic, bleeds without trauma, or becomes concerning to patient for any reason I would like to see them back for follow up to recheck for signs of malignancy. I discussed this with patient and patient agrees.      #Signs and Symptoms of non-melanoma skin cancer and ABCDEs of melanoma reviewed with patient. Patient encouraged to perform monthly self skin exams and educated on how to perform them. UV precautions reviewed with patient. Patient was asked about new or changing moles/lesions on body.     #Reviewed Sunscreen: Apply 20 minutes prior to going outdoors and reapply every two hours, when wet or sweating. We recommend using an SPF 30 or higher, and to use one that is water resistant.       Follow-up:  2 years for follow up full body skin exam, prn for new or changing lesions or new concerns    Marion Pinon, MARQUES CNP on 2/8/2023 at 4:38 PM    ____________________________________________    CC: Skin Tags (In groin area ) and Skin Check (Spot check mid back )    HPI:  Mr. Herminio Persaud is a(n) 22 year old male who presents today as a new patient for a full body skin cancer screening. Patient has concerns today about a brown spot on his back that he thinks he has had for several years.  He has not noticed anything  Metformin does not work as well taken once daily I would ask her to take 2 pills in the morning and 1 pill at night and see what happens to the sugar and follow-up with a televisit in the next 2 weeks to report where her sugars are going changing about it but his girlfriend is concerned about it because it is big and dark.  It is asymptomatic.  He also would like me to check out what he thinks is a skin tag on the right inguinal fold.  It is not bothersome or symptomatic to him..     Patient is otherwise feeling well, without additional skin concerns.     Physical Exam:  Vitals: There were no vitals taken for this visit.  SKIN: Total skin excluding the genitalia areas was performed. The exam included the head/face, neck, both arms, chest, back, abdomen, both legs, digits, mons pubis, buttock and nails.   -Brown soft papule on the right inguinal fold about 4 to 5 mm in diameter  -Mid upper back, 8 x 9 mm slightly irregular shaped darker brown homogenous macule, see photograph  -multiple tan/brown flat round macules and raised papules scattered throughout trunk, extremities and head. No worrisome features for malignancy noted on examination.  -scattered tan, homogenous macules scattered on sun exposed areas of trunk, extremities and face.     - No other lesions of concern on areas examined.         Medications:  Current Outpatient Medications   Medication     cetirizine (ZYRTEC) 10 MG tablet     No current facility-administered medications for this visit.      Past Medical History:   Patient Active Problem List   Diagnosis     Atypical pigmented skin lesion     Skin tag     Past Medical History:   Diagnosis Date     Non-recurrent unilateral inguinal hernia        CC Carlyn Toth MD  [address] [facility] [place] [address] on close of this encounter.

## 2023-04-15 ENCOUNTER — HEALTH MAINTENANCE LETTER (OUTPATIENT)
Age: 23
End: 2023-04-15

## 2023-06-14 ENCOUNTER — MYC MEDICAL ADVICE (OUTPATIENT)
Dept: FAMILY MEDICINE | Facility: CLINIC | Age: 23
End: 2023-06-14
Payer: COMMERCIAL

## 2023-06-14 DIAGNOSIS — Z11.1 SCREENING EXAMINATION FOR PULMONARY TUBERCULOSIS: Primary | ICD-10-CM

## 2023-06-14 NOTE — TELEPHONE ENCOUNTER
Please see pt's My chart message. Order has been Td up. Thanks    Kylie Brown RN  [facility]

## 2023-06-16 ENCOUNTER — LAB (OUTPATIENT)
Dept: LAB | Facility: CLINIC | Age: 23
End: 2023-06-16
Payer: COMMERCIAL

## 2023-06-16 DIAGNOSIS — Z11.1 SCREENING EXAMINATION FOR PULMONARY TUBERCULOSIS: ICD-10-CM

## 2023-06-16 PROCEDURE — 36415 COLL VENOUS BLD VENIPUNCTURE: CPT

## 2023-06-16 PROCEDURE — 86481 TB AG RESPONSE T-CELL SUSP: CPT

## 2023-06-18 LAB
GAMMA INTERFERON BACKGROUND BLD IA-ACNC: 0 IU/ML
M TB IFN-G BLD-IMP: NEGATIVE
M TB IFN-G CD4+ BCKGRND COR BLD-ACNC: 6.15 IU/ML
MITOGEN IGNF BCKGRD COR BLD-ACNC: 0 IU/ML
MITOGEN IGNF BCKGRD COR BLD-ACNC: 0.01 IU/ML
QUANTIFERON MITOGEN: 6.15 IU/ML
QUANTIFERON NIL TUBE: 0 IU/ML
QUANTIFERON TB1 TUBE: 0 IU/ML
QUANTIFERON TB2 TUBE: 0.01

## 2024-06-16 ENCOUNTER — HEALTH MAINTENANCE LETTER (OUTPATIENT)
Age: 24
End: 2024-06-16

## 2025-06-21 ENCOUNTER — HEALTH MAINTENANCE LETTER (OUTPATIENT)
Age: 25
End: 2025-06-21